# Patient Record
Sex: MALE
[De-identification: names, ages, dates, MRNs, and addresses within clinical notes are randomized per-mention and may not be internally consistent; named-entity substitution may affect disease eponyms.]

---

## 2017-12-08 ENCOUNTER — HOSPITAL ENCOUNTER (INPATIENT)
Dept: HOSPITAL 92 - ERS | Age: 82
LOS: 4 days | Discharge: NURSING, REHAB OR LONG-TERM CARE | DRG: 683 | End: 2017-12-12
Attending: INTERNAL MEDICINE | Admitting: INTERNAL MEDICINE
Payer: COMMERCIAL

## 2017-12-08 VITALS — BODY MASS INDEX: 27.9 KG/M2

## 2017-12-08 DIAGNOSIS — D64.9: ICD-10-CM

## 2017-12-08 DIAGNOSIS — E11.22: ICD-10-CM

## 2017-12-08 DIAGNOSIS — F31.9: ICD-10-CM

## 2017-12-08 DIAGNOSIS — T39.395A: ICD-10-CM

## 2017-12-08 DIAGNOSIS — T37.0X5A: ICD-10-CM

## 2017-12-08 DIAGNOSIS — T38.3X5A: ICD-10-CM

## 2017-12-08 DIAGNOSIS — F03.90: ICD-10-CM

## 2017-12-08 DIAGNOSIS — E87.5: ICD-10-CM

## 2017-12-08 DIAGNOSIS — N18.5: ICD-10-CM

## 2017-12-08 DIAGNOSIS — E87.2: ICD-10-CM

## 2017-12-08 DIAGNOSIS — I12.0: ICD-10-CM

## 2017-12-08 DIAGNOSIS — N17.9: Primary | ICD-10-CM

## 2017-12-08 DIAGNOSIS — F32.9: ICD-10-CM

## 2017-12-08 LAB
ALP SERPL-CCNC: 91 U/L (ref 40–150)
ALT SERPL W P-5'-P-CCNC: 50 U/L (ref 8–55)
ANION GAP SERPL CALC-SCNC: 17 MMOL/L (ref 10–20)
AST SERPL-CCNC: 32 U/L (ref 5–34)
BASOPHILS # BLD AUTO: 0 THOU/UL (ref 0–0.2)
BASOPHILS NFR BLD AUTO: 0.7 % (ref 0–1)
BILIRUB SERPL-MCNC: 0.6 MG/DL (ref 0.2–1.2)
BUN SERPL-MCNC: 97 MG/DL (ref 8.4–25.7)
CALCIUM SERPL-MCNC: 9.3 MG/DL (ref 7.8–10.44)
CHLORIDE SERPL-SCNC: 105 MMOL/L (ref 98–107)
CO2 SERPL-SCNC: 20 MMOL/L (ref 23–31)
CREAT CL PREDICTED SERPL C-G-VRATE: 0 ML/MIN (ref 70–130)
EOSINOPHIL # BLD AUTO: 0.3 THOU/UL (ref 0–0.7)
EOSINOPHIL NFR BLD AUTO: 4.1 % (ref 0–10)
GLOBULIN SER CALC-MCNC: 3.7 G/DL (ref 2.4–3.5)
HCT VFR BLD CALC: 29.5 % (ref 42–52)
LYMPHOCYTES # BLD: 1.1 THOU/UL (ref 1.2–3.4)
LYMPHOCYTES NFR BLD AUTO: 17.3 % (ref 21–51)
MONOCYTES # BLD AUTO: 0.4 THOU/UL (ref 0.11–0.59)
MONOCYTES NFR BLD AUTO: 5.6 % (ref 0–10)
NEUTROPHILS # BLD AUTO: 4.4 THOU/UL (ref 1.4–6.5)
RBC # BLD AUTO: 2.73 MILL/UL (ref 4.7–6.1)
WBC # BLD AUTO: 6.1 THOU/UL (ref 4.8–10.8)

## 2017-12-08 PROCEDURE — 36415 COLL VENOUS BLD VENIPUNCTURE: CPT

## 2017-12-08 PROCEDURE — 85025 COMPLETE CBC W/AUTO DIFF WBC: CPT

## 2017-12-08 PROCEDURE — 80048 BASIC METABOLIC PNL TOTAL CA: CPT

## 2017-12-08 PROCEDURE — 76770 US EXAM ABDO BACK WALL COMP: CPT

## 2017-12-08 PROCEDURE — 93005 ELECTROCARDIOGRAM TRACING: CPT

## 2017-12-08 PROCEDURE — 94640 AIRWAY INHALATION TREATMENT: CPT

## 2017-12-08 PROCEDURE — 36416 COLLJ CAPILLARY BLOOD SPEC: CPT

## 2017-12-08 PROCEDURE — 80053 COMPREHEN METABOLIC PANEL: CPT

## 2017-12-08 RX ADMIN — HEPARIN SODIUM SCH UNITS: 5000 INJECTION, SOLUTION INTRAVENOUS; SUBCUTANEOUS at 21:21

## 2017-12-08 NOTE — HP
PRIMARY  CARE PHYSICIAN:  Dr. Neymar Alanis.

 

CHIEF COMPLAINT:  Elevated creatinine.

 

HISTORY OF PRESENT ILLNESS:  Mr. Goldsmith is a pleasant 83-year-old gentleman who 
is a resident of [facility], who was sent to the emergency room 
from the nursing home because of abnormal labs.

 

He denies any chest pain or shortness of breath.  He reports that he has not 
been eating as well as he should.  He reports that he has some difficulty 
accessing his wash room.

 

The patient provides conflicting history at different times, history is 
unreliable.  Collateral history was obtained from review of medical records as 
well as discussion with the emergency room physician.

 

The patient appears to have been started on clindamycin and Bactrim for 
cellulitis on 12/04/2017.

 

REVIEW OF SYSTEMS:  The following complete review of systems was negative, 
unless otherwise mentioned in the HPI or below:

CONSTITUTIONAL:  Weight loss or gain, sense of well-being, ability to conduct 
usual activities, exercise tolerance.

SKIN/BREAST:  Rash, itching, changes in hair growth or loss, nail changes, 
breast lumps, tenderness, swelling, nipple discharge.

EYES:  Vision, double vision, tearing, blind spots, pain.

ENT/MOUTH:  Headaches (location, time of onset, duration, precipitating factors)
, vertigo, lightheadedness, injury. Vision, double vision, tearing, blind spots
, pain, nose bleeding, colds, obstruction, discharge, dental difficulties, 
gingival bleeding, dentures, neck stiffness, pain, tenderness, masses in 
thyroid or other areas.

CARDIOVASCULAR:  Precordial pain, substernal distress, palpitations, syncope, 
dyspnea on exertion, orthopnea, nocturnal paroxysmal dyspnea, edema, cyanosis, 
hypertension, heart murmurs, varicosities, phlebitis, claudication.

RESPIRATORY:  Pain, shortness of breath, wheezing, stridor, cough, hemoptysis, 
fever or night sweats

GASTROINTESTINAL:  Poor appetite, dysphagia, indigestion, abdominal pain, 
heartburn, eructation, nausea, vomiting, hematemesis, jaundice, constipation, 
or diarrhea, abnormal stools (baylee-colored, tarry, bloody, greasy, foul smelling
), flatulence, hemorrhoids, recent changes in bowel habits.

GENITOURINARY:  Urgency, frequency, dysuria, nocturia, hematuria, polyuria, 
oliguria, unusual (or change in) color of urine, stones, hesitancy, change in 
size of stream, dribbling, acute retention or incontinence, libido, potency.

MUSCULOSKELETAL:  Pain, swelling, redness or heat of muscles or joints, 
limitation, of motion, muscular weakness, atrophy, cramps.

NEUROLOGIC/PSYCHIATRIC:  Convulsions, paralyses, tremor, incoordination, 
paresthesias, difficulties with memory of speech, sensory or motor disturbances
, or muscular coordination (ataxia, tremor), emotional problems, anxiety, 
depression, previous psychiatric care, unusual perceptions, hallucinations.

ALLERGY/IMMUNOLOGIC:  Skin rash, anemia, bleeding tendency, polydipsia, polyuria
, intolerance to heat or cold.

 

PAST MEDICAL HISTORY:  Significant for dementia, diabetes mellitus type 2, and 
hypertension.

 

PAST SURGICAL HISTORY:  Unable to elicit.

 

PSYCHIATRIC HISTORY:  Significant for depression and bipolar disorder.

 

FAMILY HISTORY:  Unable to elicit.

 

CODE STATUS:  I could not discuss CODE STATUS with him.  This will need to be 
addressed.

 

ALLERGIES:  No known drug allergies.

 

CURRENT MEDICATIONS:  Include clindamycin 300 mg every 6 hours, Prozac 20 mg 
daily, metformin 500 mg 2 times a day, multivitamins 1 tablet daily, 
risperidone 0.25 mg 2 times a day, Motrin 400 mg 4 times a day for 7 days, 
Bactrim 1 tablet 2 times a day.

 

PHYSICAL EXAMINATION:

GENERAL:  Mr. Goldsmith is awake and alert, not in acute distress.

VITAL SIGNS:  Blood pressure is 139/64, pulse is 71.  He is breathing at rate 
of 20 and saturating 96% on room air.  He is afebrile.

EYES:  No scleral icterus.  No conjunctival pallor.

ENT:  Dry mucosal membranes, no oropharyngeal erythema or exudates.

NECK:  Supple, nontender, normal range of movement, trachea is midline.

RESPIRATORY:  Accessory muscles of breathing are not active.  Chest wall 
movements are symmetric bilaterally.

LUNGS:  Clear to auscultation without wheeze, rhonchi or crepitations.

ABDOMEN:  Soft, nontender, bowel sounds are heard, no hepatomegaly, no 
splenomegaly.

NEUROLOGIC:  Cranial nerves II-XII are intact.  Deep tendon reflexes are 2+.

SKIN:  No rashes or subcutaneous nodules.  Multiple tattoos present.

MUSCULOSKELETAL:  He has a splint over his left wrist.  He reports that is for 
gout.  Power is 5/5 in all 4 extremities.

LYMPHATIC:  No cervical lymphadenopathy.

PSYCHIATRIC:  Normal mood, normal affect, patient is oriented to person and 
place, not to time.

 

Mr. Goldsmith's labs and investigations were reviewed.  Laboratory investigations 
were ordered by the emergency room, physician are pending.  Review of the blood 
work from [facility] shows that on 12/07/2017, he had sodium 139, 
potassium 5.1, anion gap elevated at 22, decreased calcium of 8.5, elevated 
blood urea nitrogen of 75, elevated creatinine of 5.0, decreased albumin of 2.8
, elevated ALT of 57, normal total bilirubin, normal AST, normal alkaline 
phosphatase.  Hemoglobin A1c was 5.6.

 

ASSESSMENT AND PLAN:  Mr. Goldsmith is a pleasant 83-year-old gentleman, who was 
seen at [facility] on 12/08/2017.  His problem list 
includes:

1.  Acute renal failure:  Mr. Goldsmith appears to be presenting with acute renal 
failure, likely multifactorial, given his use of nonsteroidal anti-inflammatory 
agents and poor oral intake.  Bactrim may have contributed to the elevated 
creatinine as well.  This may have been worsened by his concomitant use of 
metformin.  He will be admitted to the hospital.  Nephrotoxic medications will 
be on hold.  He will receive intravenous hydration.  Nephrology Service is 
being consulted for their opinion and help with further management.

2.  Dementia:  Appears to be stable at this time.

3.  Diabetes mellitus.  Hold metformin, start Accu-Cheks and insulin sliding 
scale.

4.  Hypertension:  Monitor vital signs, titrate antihypertensives as needed.

5.  History of cellulitis:  This appears to have resolved.  We will stop 
antibiotics.

 

Many thanks for allowing me to participate in your patient's care.  Please feel 
free to contact me with any questions or concerns.

 

LEVEL OF RISK:  Moderate.

 

LEVEL OF COMPLEXITY:  Moderate.

 

MTDD

## 2017-12-08 NOTE — CON
DATE OF CONSULTATION:  12/08/2017

 

REASON FOR CONSULTATION:  Elevated creatinine.

 

HISTORY OF PRESENT ILLNESS:  This is a very pleasant 83-year-old gentleman with a baseline creatinine
 of 1.1 in 10/10, presented to the hospital with altered mental status and a creatinine of 5.  The pa
fly denies no headache, numbness, tingling or weakness.  Denies any nausea, vomiting or chest pain.
  The patient was recently treated with clindamycin and Bactrim.  The patient has had history of poor
ly controlled diabetes mellitus.

 

PAST MEDICAL HISTORY:  Significant for chest pain, laminectomy, spinal stenosis, diabetes mellitus po
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

 

HOME MEDICATIONS:  List reviewed.

 

HOSPITAL MEDICATIONS:  List reviewed.

 

ALLERGIES:  Reviewed.

 

FAMILY HISTORY:  Negative for ESRD.

 

SOCIAL HISTORY:  No drug use.

 

REVIEW OF SYSTEMS:  Fifteen point review of systems was performed and negative except for positives n
oted above.

General:  Weakness.  Head:  Headache.  Neck:  No swelling or lumps.  Nose:  No epistaxis or discharge
.  Eyes:  No diplopia or pain.  Respiratory:  Dyspnea.  Cardiovascular:  Chest pain.  Gastrointestina
l:  Nausea.  /GYN:  Hematuria.  Musculoskeletal:  No joint pain.  Neuropsychiatic systems:  No suic
idal ideation.  No ideation.  Skin:  Denies any rash or ulcer.  Constitutional:   No fever or chills.


 

PHYSICAL EXAMINATION:

GENERAL:  Patient is awake, alert.

VITAL SIGNS:  Afebrile, pulse 75, breathing at 16, blood pressure was 130/70.

GENERAL APPEARANCE AND MENTAL STATUS:  Fair.

HEAD/NECK:  Normocephalic.   Atraumatic.

EYES:  EOMI.  No deformity.

EARS:  Clear.  No ulcers.

NOSE:   Intact.  No lesions.

MOUTH:  Clear.  No discharge.

THROAT:  Clear.  No exudate.

LUNGS:   Clear.  No crackles.

CARDIAC:   S1, S2.  No rub.

ABDOMEN:   Benign.  BS+.

GENITALIA/RECTUM:  Horowitz absent.

BACK/EXTREMITIES:  Edema 0+ Ulcer-

NEUROLOGICAL:  Alert and motor intact.                     

SKIN:  Rash- Bruise-  

LYMPHATICS:  Edema- Ulcer-

 

LABORATORY DATA:  Creatinine 5, potassium is normal.

 

ASSESSMENT AND RECOMMENDATIONS:

1.  Stage V chronic kidney disease with acute kidney failure.  Would recommend hydration.  Would hold
 Bactrim.  Can use Cipro.

2.  Anemia, stable.

3.  Medications based on glomerular filtration rate are appropriate.  No indication for dialysis at t
his time.  We will follow the patient's renal function closely and order renal imaging.

## 2017-12-09 LAB
ANION GAP SERPL CALC-SCNC: 17 MMOL/L (ref 10–20)
ANION GAP SERPL CALC-SCNC: 17 MMOL/L (ref 10–20)
BASOPHILS # BLD AUTO: 0 THOU/UL (ref 0–0.2)
BASOPHILS NFR BLD AUTO: 0.5 % (ref 0–1)
BUN SERPL-MCNC: 78 MG/DL (ref 8.4–25.7)
BUN SERPL-MCNC: 84 MG/DL (ref 8.4–25.7)
CALCIUM SERPL-MCNC: 8.6 MG/DL (ref 7.8–10.44)
CALCIUM SERPL-MCNC: 8.9 MG/DL (ref 7.8–10.44)
CHLORIDE SERPL-SCNC: 109 MMOL/L (ref 98–107)
CHLORIDE SERPL-SCNC: 111 MMOL/L (ref 98–107)
CO2 SERPL-SCNC: 16 MMOL/L (ref 23–31)
CO2 SERPL-SCNC: 17 MMOL/L (ref 23–31)
CREAT CL PREDICTED SERPL C-G-VRATE: 13 ML/MIN (ref 70–130)
CREAT CL PREDICTED SERPL C-G-VRATE: 14 ML/MIN (ref 70–130)
EOSINOPHIL # BLD AUTO: 0.2 THOU/UL (ref 0–0.7)
EOSINOPHIL NFR BLD AUTO: 4.2 % (ref 0–10)
HCT VFR BLD CALC: 28.8 % (ref 42–52)
LYMPHOCYTES # BLD: 1 THOU/UL (ref 1.2–3.4)
LYMPHOCYTES NFR BLD AUTO: 17 % (ref 21–51)
MONOCYTES # BLD AUTO: 0.5 THOU/UL (ref 0.11–0.59)
MONOCYTES NFR BLD AUTO: 7.8 % (ref 0–10)
NEUTROPHILS # BLD AUTO: 4.1 THOU/UL (ref 1.4–6.5)
RBC # BLD AUTO: 2.63 MILL/UL (ref 4.7–6.1)
WBC # BLD AUTO: 5.8 THOU/UL (ref 4.8–10.8)

## 2017-12-09 RX ADMIN — HEPARIN SODIUM SCH UNITS: 5000 INJECTION, SOLUTION INTRAVENOUS; SUBCUTANEOUS at 14:49

## 2017-12-09 RX ADMIN — ALBUTEROL SULFATE SCH MG: 2.5 SOLUTION RESPIRATORY (INHALATION) at 10:43

## 2017-12-09 RX ADMIN — ALBUTEROL SULFATE SCH: 2.5 SOLUTION RESPIRATORY (INHALATION) at 11:49

## 2017-12-09 RX ADMIN — INSULIN HUMAN PRN UNIT: 100 INJECTION, SOLUTION PARENTERAL at 06:39

## 2017-12-09 RX ADMIN — ALBUTEROL SULFATE SCH: 1.25 SOLUTION RESPIRATORY (INHALATION) at 08:45

## 2017-12-09 RX ADMIN — HEPARIN SODIUM SCH UNITS: 5000 INJECTION, SOLUTION INTRAVENOUS; SUBCUTANEOUS at 20:38

## 2017-12-09 RX ADMIN — ALBUTEROL SULFATE SCH MG: 2.5 SOLUTION RESPIRATORY (INHALATION) at 09:25

## 2017-12-09 RX ADMIN — ALBUTEROL SULFATE SCH: 2.5 SOLUTION RESPIRATORY (INHALATION) at 11:48

## 2017-12-09 RX ADMIN — ALBUTEROL SULFATE SCH: 1.25 SOLUTION RESPIRATORY (INHALATION) at 09:07

## 2017-12-09 RX ADMIN — HEPARIN SODIUM SCH UNITS: 5000 INJECTION, SOLUTION INTRAVENOUS; SUBCUTANEOUS at 07:55

## 2017-12-09 NOTE — ULT
BILATERAL RENAL ULTRASOUND:

 

Date:  12/09/17 

 

HISTORY:  

Acute kidney insufficiency. 

 

COMPARISON:  

None. 

 

TECHNIQUE:  

Sagittal and transverse imaging of kidneys performed. 

 

FINDINGS:

Bilaterally, no hydronephrosis. 

 

There is an anechoic focus in the right renal pelvis likely representing a parapelvic cyst measuring 
7.2 x 6.0 x 7.3 cm. 

 

Right kidney measures 11.0 x 6.1 x 5.7 cm. 

 

Left kidney measures 11.0 x 5.2 x 6.2 cm. 

 

Urinary bladder has a pre-void volume of 272 cm2. No mucosal abnormality. 

 

IMPRESSION: 

1.  No hydronephrosis. 

2.  Right renal cyst. 

 

 

POS: [facility]

## 2017-12-09 NOTE — PDOC.PN
- Subjective


Encounter Start Date: 12/09/17


Encounter Start Time: 08:20





Pt seen for followup re:  MARTÍN.  Denies chest pain, shortness of breath, fevers 

or chills.





- Objective


MAR Reviewed: Yes


Vital Signs & Weight: 


 Vital Signs (12 hours)











  Temp Pulse Resp BP BP Pulse Ox


 


 12/09/17 10:43   111 H  18    97


 


 12/09/17 09:25   106 H  18    96


 


 12/09/17 08:00  98.4 F  106 H  18    98


 


 12/09/17 07:56  98.4 F  106 H  18  122/66   96


 


 12/09/17 04:00  97.4 F L  80  20   127/64  95


 


 12/09/17 00:00  97.7 F  84  20   135/56 L  96








 Weight











Admit Weight                   205 lb 14.576 oz


 


Weight                         205 lb 14.576 oz














I&O: 


 











 12/08/17 12/09/17 12/10/17





 06:59 06:59 06:59


 


Intake Total   1590


 


Output Total   1050


 


Balance   540











Result Diagrams: 


 12/09/17 04:03





 12/09/17 04:03


Additional Labs: 


 Accuchecks











  12/09/17





  05:44


 


POC Glucose  193 H














Dx/Plan


(1) MARTÍN (acute kidney injury)


Code(s): N17.9 - ACUTE KIDNEY FAILURE, UNSPECIFIED   Status: Acute   





(2) Hyperkalemia


Code(s): E87.5 - HYPERKALEMIA   Status: Acute   





(3) Metabolic acidosis


Code(s): E87.2 - ACIDOSIS   Status: Acute   





(4) DM2 (diabetes mellitus, type 2)


Status: Chronic   





(5) HTN (hypertension)


Code(s): I10 - ESSENTIAL (PRIMARY) HYPERTENSION   Status: Chronic   





(6) Dementia


Code(s): F03.90 - UNSPECIFIED DEMENTIA WITHOUT BEHAVIORAL DISTURBANCE   Status: 

Chronic   





- Plan


PT/OT, out of bed/ambulate, DVT proph w/heparin





* .


Continue IV fluids.


Bicarbonate added by nephrology service.


Administer kayexalate, beta agonist nebs andrecheck potassium.


Continue accuchecks, insulin.


Monitor vital signs, titrate antihypertensives as needed.





Review of Systems





- Review of Systems


Constitutional: Other.  negative: Fever, Chills, Sweats, Weakness, Malaise


Respiratory: negative: Cough, Dry, Shortness of Breath, Hemoptysis, SOB with 

Excertion, Pleuritic Pain, Sputum, Wheezing


Cardiovascular: negative: Chest Pain, Palpitations, Orthopnea, Paroxysmal Noc. 

Dyspnea, Edema, Light Headedness


Gastrointestinal: negative: Nausea, Vomiting, Abdominal Pain, Diarrhea, 

Constipation, Melena, Hematochezia


Genitourinary: negative: Dysuria, Frequency, Incontinence, Hematuria, Retention





- Medications/Allergies


Allergies/Adverse Reactions: 


 Allergies











Allergy/AdvReac Type Severity Reaction Status Date / Time


 


No Known Drug Allergies Allergy   Verified 12/08/17 18:13











Medications: 


 Current Medications





Acetaminophen (Tylenol)  650 mg PO Q4H PRN


   PRN Reason: Headache/Fever or Pain


Albuterol Sulfate (Ventolin)  7.5 mg NEB Q1H [facility]


   Stop: 12/09/17 11:31


   Last Admin: 12/09/17 10:43 Dose:  7.5 mg


Albuterol Sulfate (Ventolin)  2.5 mg NEB Q1H [facility]


   Stop: 12/09/17 11:31


   Last Admin: 12/09/17 10:43 Dose:  2.5 mg


Bisacodyl (Dulcolax)  10 mg PO DAILYPRN PRN


   PRN Reason: Constipation


Dextrose/Water (Dextrose 50%)  25 gm SLOW IVP PRN PRN


   PRN Reason: Hypoglycemia


Glucagon (Glucagon)  1 mg IM PRN PRN


   PRN Reason: Hypoglycemia


Heparin Sodium (Porcine) (Heparin)  5,000 units SC TID [facility]


   Last Admin: 12/09/17 07:55 Dose:  5,000 units


Dextrose/Water (D5w)  1,000 mls @ 0 mls/hr IV .Q0M PRN; As Directed


   PRN Reason: Hypoglycemia


Sodium Bicarbonate 150 meq/ (Dextrose/Water)  1,150 mls @ 100 mls/hr IV 

[id number] [facility]


Insulin Human Regular (Humulin R)  0 units SC .MILD SLIDING SCALE PRN


   PRN Reason: Mild Correctional Scale


   Last Admin: 12/09/17 06:39 Dose:  2 unit

## 2017-12-09 NOTE — PRG
DATE OF SERVICE:  12/09/2017

 

SUBJECTIVE:  An 83-year-old gentleman being seen for acute kidney injury.  The patient denies any linda
sea, vomiting or chest pain.

 

PHYSICAL EXAMINATION:

GENERAL:  Patient is awake and alert.

VITAL SIGNS:  Afebrile, pulse 111, breathing at 16, blood pressure 122/66.

HEAD/NECK:  Normocephalic.   Atraumatic.

EYES:  EOMI.  No deformity.

EARS:  Clear.  No ulcers.

NOSE:   Intact.  No lesions.

MOUTH:  Clear.  No discharge.

THROAT:  Clear.  No exudate.

LUNGS:   Clear.  No crackles.

CARDIAC:   S1, S2.  No rub.

ABDOMEN:   Benign.  BS+.

GENITALIA/RECTUM:  Horowitz absent.

BACK/EXTREMITIES:  Edema 0+ Ulcer-

NEUROLOGICAL:  Alert and motor intact.                     

SKIN:  Rash- Bruise-  

LYMPHATICS:  Edema- Ulcer-

 

LABORATORY DATA:  Show hemoglobin 9.4, potassium 5.8, creatinine 5.74.

 

ASSESSMENT AND RECOMMENDATIONS:

1.  Acute kidney injury, improving.  Continue hydration.

2.  Metabolic acidosis.  We will add sodium bicarbonate.

3.  Anemia, stable.

4.  Medications based on glomerular filtration rate are appropriate.  No urgent indication for dialys
is.  I will order renal imaging.

## 2017-12-10 LAB
ANION GAP SERPL CALC-SCNC: 12 MMOL/L (ref 10–20)
BASOPHILS # BLD AUTO: 0 THOU/UL (ref 0–0.2)
BASOPHILS NFR BLD AUTO: 0.2 % (ref 0–1)
BUN SERPL-MCNC: 57 MG/DL (ref 8.4–25.7)
CALCIUM SERPL-MCNC: 8.6 MG/DL (ref 7.8–10.44)
CHLORIDE SERPL-SCNC: 106 MMOL/L (ref 98–107)
CO2 SERPL-SCNC: 25 MMOL/L (ref 23–31)
CREAT CL PREDICTED SERPL C-G-VRATE: 20 ML/MIN (ref 70–130)
EOSINOPHIL # BLD AUTO: 0.2 THOU/UL (ref 0–0.7)
EOSINOPHIL NFR BLD AUTO: 3.7 % (ref 0–10)
HCT VFR BLD CALC: 25.8 % (ref 42–52)
LYMPHOCYTES # BLD: 1.2 THOU/UL (ref 1.2–3.4)
LYMPHOCYTES NFR BLD AUTO: 22.5 % (ref 21–51)
MONOCYTES # BLD AUTO: 0.5 THOU/UL (ref 0.11–0.59)
MONOCYTES NFR BLD AUTO: 8.3 % (ref 0–10)
NEUTROPHILS # BLD AUTO: 3.5 THOU/UL (ref 1.4–6.5)
RBC # BLD AUTO: 2.37 MILL/UL (ref 4.7–6.1)
WBC # BLD AUTO: 5.4 THOU/UL (ref 4.8–10.8)

## 2017-12-10 RX ADMIN — HYDROCODONE BITARTRATE AND ACETAMINOPHEN PRN TAB: 5; 325 TABLET ORAL at 01:38

## 2017-12-10 RX ADMIN — HYDROCODONE BITARTRATE AND ACETAMINOPHEN PRN TAB: 5; 325 TABLET ORAL at 05:28

## 2017-12-10 RX ADMIN — THERA TABS SCH TAB: TAB at 07:35

## 2017-12-10 RX ADMIN — HEPARIN SODIUM SCH UNITS: 5000 INJECTION, SOLUTION INTRAVENOUS; SUBCUTANEOUS at 21:05

## 2017-12-10 RX ADMIN — HEPARIN SODIUM SCH UNITS: 5000 INJECTION, SOLUTION INTRAVENOUS; SUBCUTANEOUS at 07:35

## 2017-12-10 RX ADMIN — HEPARIN SODIUM SCH UNITS: 5000 INJECTION, SOLUTION INTRAVENOUS; SUBCUTANEOUS at 14:56

## 2017-12-10 NOTE — PDOC.PN
- Subjective


Encounter Start Date: 12/10/17


Encounter Start Time: 09:00





Pt seen for followup re:  MARTÍN.  Denies chest pain, shortness of breath, fevers 

or chills.





- Objective


MAR Reviewed: Yes


Vital Signs & Weight: 


 Vital Signs (12 hours)











  Temp Pulse Resp BP Pulse Ox


 


 12/10/17 08:00  98.1 F  77  20  148/76 H  96








 Weight











Admit Weight                   205 lb 14.576 oz


 


Weight                         205 lb 14.576 oz














I&O: 


 











 12/09/17 12/10/17 12/11/17





 06:59 06:59 06:59


 


Intake Total  3990 240


 


Output Total  1050 


 


Balance  2940 240











Result Diagrams: 


 12/10/17 04:04





 12/10/17 04:04


Additional Labs: 


 Accuchecks











  12/09/17 12/09/17





  15:39 11:29


 


POC Glucose  268 H  166 H














Phys Exam





- Physical Examination


Constitutional: NAD


HEENT: moist MMs


Neck: supple


Respiratory: clear to auscultation bilateral


Cardiovascular: RRR


Gastrointestinal: soft


Musculoskeletal: pulses present


Neurological: moves all 4 limbs


Psychiatric: normal affect





Dx/Plan


(1) MARTÍN (acute kidney injury)


Code(s): N17.9 - ACUTE KIDNEY FAILURE, UNSPECIFIED   Status: Acute   





(2) Metabolic acidosis


Code(s): E87.2 - ACIDOSIS   Status: Acute   





(3) DM2 (diabetes mellitus, type 2)


Status: Chronic   





(4) HTN (hypertension)


Code(s): I10 - ESSENTIAL (PRIMARY) HYPERTENSION   Status: Chronic   





(5) Dementia


Code(s): F03.90 - UNSPECIFIED DEMENTIA WITHOUT BEHAVIORAL DISTURBANCE   Status: 

Chronic   





(6) Hyperkalemia


Code(s): E87.5 - HYPERKALEMIA   Status: Resolved   





- Plan


PT/OT, out of bed/ambulate, DVT proph w/heparin





* .


creatinine better.


Continue IV fluids.


Continue accuchecks, insulin.


Monitor vital signs, titrate antihypertensives as needed.





Review of Systems





- Review of Systems


Respiratory: negative: Cough, Dry, Shortness of Breath, Hemoptysis, SOB with 

Excertion, Pleuritic Pain, Sputum, Wheezing


Cardiovascular: negative: Chest Pain, Palpitations, Orthopnea, Paroxysmal Noc. 

Dyspnea, Edema, Light Headedness





- Medications/Allergies


Allergies/Adverse Reactions: 


 Allergies











Allergy/AdvReac Type Severity Reaction Status Date / Time


 


No Known Drug Allergies Allergy   Verified 12/08/17 18:13











Medications: 


 Current Medications





Acetaminophen (Tylenol)  650 mg PO Q4H PRN


   PRN Reason: Fever/Mild Pain


   Last Admin: 12/09/17 23:12 Dose:  650 mg


Bisacodyl (Dulcolax)  10 mg PO DAILYPRN PRN


   PRN Reason: Constipation


Dextrose/Water (Dextrose 50%)  25 gm SLOW IVP PRN PRN


   PRN Reason: Hypoglycemia


Docusate Sodium (Colace)  100 mg PO BID PRN


   PRN Reason: Constipation


Fluoxetine HCl (Prozac)  20 mg PO DAILY [facility]


   Last Admin: 12/10/17 07:35 Dose:  20 mg


Glucagon (Glucagon)  1 mg IM PRN PRN


   PRN Reason: Hypoglycemia


Heparin Sodium (Porcine) (Heparin)  5,000 units SC TID [facility]


   Last Admin: 12/10/17 07:35 Dose:  5,000 units


Dextrose/Water (D5w)  1,000 mls @ 0 mls/hr IV .Q0M PRN; As Directed


   PRN Reason: Hypoglycemia


Sodium Bicarbonate 150 meq/ (Dextrose/Water)  1,150 mls @ 100 mls/hr IV 

[id number] [facility]


   Last Admin: 12/10/17 10:18 Dose:  Not Given


Insulin Human Regular (Humulin R)  0 units SC .MILD SLIDING SCALE PRN


   PRN Reason: Mild Correctional Scale


   Last Admin: 12/09/17 06:39 Dose:  2 unit


Multivitamins (Theragran)  1 tab PO DAILY [facility]


   Last Admin: 12/10/17 07:35 Dose:  1 tab


Risperidone (Risperidone)  0.25 mg PO BID [facility]


   Last Admin: 12/10/17 07:35 Dose:  0.25 mg

## 2017-12-10 NOTE — PRG
DATE OF SERVICE:  12/10/2017

 

SUBJECTIVE:  This is an 83-year-old gentleman being seen for acute kidney injury.  The patient denies
 any nausea, vomiting, or chest pain.

 

PHYSICAL EXAMINATION:

GENERAL:  Patient is awake, alert.

VITAL SIGNS:  Afebrile, pulse 70, breathing at 16, blood pressure 142/76.

GENERAL APPEARANCE AND MENTAL STATUS:  Fair.

HEAD/NECK:  Normocephalic.  Atraumatic.

EYES:  EOMI.  No deformity.

EARS:  Clear.  No ulcers.

NOSE:  Intact.  No lesions.

MOUTH:  Clear.  No discharge.

THROAT:  Clear.  No exudate.

LUNGS:  Clear.  No crackles.

CARDIAC:  S1, S2.  No rub.

ABDOMEN:  Benign.  BS+.

GENITALIA/RECTUM:  Horowitz absent.

BACK/EXTREMITIES:  Edema 0+ Ulcer-

NEUROLOGICAL:  Alert and motor intact.

SKIN:  Rash- Bruise-

LYMPHATICS:  Edema- Ulcer-

 

LABORATORY:  Hemoglobin 8.3, creatinine 3.6.

 

ASSESSMENT AND RECOMMENDATIONS:

1.  Acute kidney injury, improving.

2.  Hypertension, stable.

3.  Anemia, stable.

4.  Medications based on GFR are appropriate.

5.  Metabolic acidosis, improved.  No indication for dialysis at this time.

## 2017-12-11 LAB
ANION GAP SERPL CALC-SCNC: 11 MMOL/L (ref 10–20)
BASOPHILS # BLD AUTO: 0 THOU/UL (ref 0–0.2)
BASOPHILS NFR BLD AUTO: 0.6 % (ref 0–1)
BUN SERPL-MCNC: 33 MG/DL (ref 8.4–25.7)
CALCIUM SERPL-MCNC: 9.1 MG/DL (ref 7.8–10.44)
CHLORIDE SERPL-SCNC: 104 MMOL/L (ref 98–107)
CO2 SERPL-SCNC: 29 MMOL/L (ref 23–31)
CREAT CL PREDICTED SERPL C-G-VRATE: 29 ML/MIN (ref 70–130)
EOSINOPHIL # BLD AUTO: 0.2 THOU/UL (ref 0–0.7)
EOSINOPHIL NFR BLD AUTO: 3.9 % (ref 0–10)
HCT VFR BLD CALC: 27.8 % (ref 42–52)
LYMPHOCYTES # BLD: 1.6 THOU/UL (ref 1.2–3.4)
LYMPHOCYTES NFR BLD AUTO: 27.5 % (ref 21–51)
MONOCYTES # BLD AUTO: 0.3 THOU/UL (ref 0.11–0.59)
MONOCYTES NFR BLD AUTO: 5.6 % (ref 0–10)
NEUTROPHILS # BLD AUTO: 3.5 THOU/UL (ref 1.4–6.5)
RBC # BLD AUTO: 2.57 MILL/UL (ref 4.7–6.1)
WBC # BLD AUTO: 5.7 THOU/UL (ref 4.8–10.8)

## 2017-12-11 RX ADMIN — SODIUM BICARBONATE SCH MLS: 84 INJECTION, SOLUTION INTRAVENOUS at 05:50

## 2017-12-11 RX ADMIN — INSULIN HUMAN PRN UNIT: 100 INJECTION, SOLUTION PARENTERAL at 11:58

## 2017-12-11 RX ADMIN — HEPARIN SODIUM SCH UNITS: 5000 INJECTION, SOLUTION INTRAVENOUS; SUBCUTANEOUS at 20:33

## 2017-12-11 RX ADMIN — INSULIN HUMAN PRN UNIT: 100 INJECTION, SOLUTION PARENTERAL at 20:35

## 2017-12-11 RX ADMIN — SODIUM BICARBONATE SCH: 84 INJECTION, SOLUTION INTRAVENOUS at 15:13

## 2017-12-11 RX ADMIN — SODIUM BICARBONATE SCH MLS: 84 INJECTION, SOLUTION INTRAVENOUS at 11:58

## 2017-12-11 RX ADMIN — HEPARIN SODIUM SCH UNITS: 5000 INJECTION, SOLUTION INTRAVENOUS; SUBCUTANEOUS at 14:24

## 2017-12-11 RX ADMIN — THERA TABS SCH TAB: TAB at 08:47

## 2017-12-11 RX ADMIN — HEPARIN SODIUM SCH UNITS: 5000 INJECTION, SOLUTION INTRAVENOUS; SUBCUTANEOUS at 08:47

## 2017-12-11 NOTE — PDOC.PN
- Subjective


Encounter Start Date: 12/11/17


Encounter Start Time: 09:20





Pt seen for followup re:  MARTÍN,  Feels well, no complaints.





- Objective


Vital Signs & Weight: 


 Vital Signs (12 hours)











  Temp Pulse Resp BP Pulse Ox


 


 12/11/17 08:00  98.0 F  82  20  138/70  95








 Weight











Admit Weight                   205 lb 14.576 oz


 


Weight                         205 lb 14.576 oz














I&O: 


 











 12/10/17 12/11/17 12/12/17





 06:59 06:59 06:59


 


Intake Total 3990 2480 


 


Output Total 1050  


 


Balance 2940 2480 











Result Diagrams: 


 12/11/17 04:07





 12/11/17 04:07


Additional Labs: 


 Accuchecks











  12/11/17 12/11/17 12/10/17





  11:41 05:10 20:38


 


POC Glucose  190 H  160 H  170 H














  12/10/17 12/10/17 12/09/17





  16:45 05:34 19:53


 


POC Glucose  169 H  144 H  165 H














Phys Exam





- Physical Examination


Constitutional: NAD


HEENT: moist MMs


Neck: supple


Respiratory: clear to auscultation bilateral


Cardiovascular: RRR


Gastrointestinal: soft


Musculoskeletal: no edema


Neurological: moves all 4 limbs


Psychiatric: normal affect


Skin: no rash





Dx/Plan


(1) MARTÍN (acute kidney injury)


Code(s): N17.9 - ACUTE KIDNEY FAILURE, UNSPECIFIED   Status: Acute   





(2) DM2 (diabetes mellitus, type 2)


Status: Chronic   





(3) HTN (hypertension)


Code(s): I10 - ESSENTIAL (PRIMARY) HYPERTENSION   Status: Chronic   





(4) Dementia


Code(s): F03.90 - UNSPECIFIED DEMENTIA WITHOUT BEHAVIORAL DISTURBANCE   Status: 

Chronic   





(5) Hyperkalemia


Code(s): E87.5 - HYPERKALEMIA   Status: Resolved   





(6) Metabolic acidosis


Code(s): E87.2 - ACIDOSIS   Status: Resolved   





- Plan


PT/OT, out of bed/ambulate





* .


Creatinine improving.


Obtain previous records from PCP.


Continue accuchecks, insulin.





Review of Systems





- Review of Systems


Respiratory: negative: Cough, Dry, Shortness of Breath, Hemoptysis, SOB with 

Excertion, Pleuritic Pain, Sputum, Wheezing


Cardiovascular: negative: Chest Pain, Palpitations, Orthopnea, Paroxysmal Noc. 

Dyspnea, Edema, Light Headedness





- Medications/Allergies


Allergies/Adverse Reactions: 


 Allergies











Allergy/AdvReac Type Severity Reaction Status Date / Time


 


No Known Drug Allergies Allergy   Verified 12/08/17 18:13











Medications: 


 Current Medications





Acetaminophen (Tylenol)  650 mg PO Q4H PRN


   PRN Reason: Fever/Mild Pain


   Last Admin: 12/11/17 00:38 Dose:  650 mg


Bisacodyl (Dulcolax)  10 mg PO DAILYPRN PRN


   PRN Reason: Constipation


Dextrose/Water (Dextrose 50%)  25 gm SLOW IVP PRN PRN


   PRN Reason: Hypoglycemia


Docusate Sodium (Colace)  100 mg PO BID PRN


   PRN Reason: Constipation


Fluoxetine HCl (Prozac)  20 mg PO DAILY [facility]


   Last Admin: 12/11/17 08:47 Dose:  20 mg


Glucagon (Glucagon)  1 mg IM PRN PRN


   PRN Reason: Hypoglycemia


Heparin Sodium (Porcine) (Heparin)  5,000 units SC TID [facility]


   Last Admin: 12/11/17 08:47 Dose:  5,000 units


Dextrose/Water (D5w)  1,000 mls @ 0 mls/hr IV .Q0M PRN; As Directed


   PRN Reason: Hypoglycemia


Sodium Bicarbonate 75 meq/ (Dextrose/Water)  575 mls @ 100 mls/hr IV .Q5H45M [facility]


   Last Admin: 12/11/17 11:58 Dose:  575 mls


Insulin Human Regular (Humulin R)  0 units SC .MILD SLIDING SCALE PRN


   PRN Reason: Mild Correctional Scale


   Last Admin: 12/11/17 11:58 Dose:  2 unit


Multivitamins (Theragran)  1 tab PO DAILY [facility]


   Last Admin: 12/11/17 08:47 Dose:  1 tab


Risperidone (Risperidone)  0.25 mg PO BID [facility]


   Last Admin: 12/11/17 08:47 Dose:  0.25 mg

## 2017-12-11 NOTE — PRG
DATE OF SERVICE:  12/11/2017

 

SUBJECTIVE:  Patient was seen and examined at bedside and overnight events noted.  Patient denies any
 shortness of breath or chest pain or palpitation.  No history of nausea or vomiting or diarrhea or f
ever or chills or cramps.

 

OBJECTIVE:

GENERAL:  This is an elderly male in no apparent distress.

VITAL SIGNS:  Temperature 98.0, pulse 80, respiratory rate 18, blood pressure 138/70.

HEENT:  Atraumatic, normocephalic.  Oral mucosa is moist.

NECK:  Supple.

CARDIOVASCULAR:  S1, S2 heard.  Rate and rhythm regular.

RESPIRATORY:  Clear to auscultation.

GASTROINTESTINAL:  Abdomen is soft.

MUSCULOSKELETAL:  No tenderness.  No edema.

DERMATOLOGIC:  No skin rash.

NEUROLOGIC:  Alert and awake and oriented x3.  No focal neurologic deficits. Moving all the extremiti
es.

PSYCHIATRIC:  Mood and affect normal.

 

LABORATORY DATA:  Potassium is 4.6, BUN 33, creatinine is 2.5.

 

ASSESSMENT AND PLAN:

1.  Acute kidney injury, improving.  We will change IV fluids to half normal saline.

2.  Acidosis, much better.  Will change bicarbonate to half normal saline.

3.  Hypertension, stable.

4.  Anemia.

5.  Plan is to change IV fluids to half normal saline, monitor renal function which is getting better
.  Avoid nephrotoxins, renally dose all the medicines.  We will follow.

## 2017-12-12 VITALS — TEMPERATURE: 98.4 F | SYSTOLIC BLOOD PRESSURE: 175 MMHG | DIASTOLIC BLOOD PRESSURE: 85 MMHG

## 2017-12-12 LAB
ANION GAP SERPL CALC-SCNC: 10 MMOL/L (ref 10–20)
BUN SERPL-MCNC: 23 MG/DL (ref 8.4–25.7)
CALCIUM SERPL-MCNC: 8.6 MG/DL (ref 7.8–10.44)
CHLORIDE SERPL-SCNC: 103 MMOL/L (ref 98–107)
CO2 SERPL-SCNC: 26 MMOL/L (ref 23–31)
CREAT CL PREDICTED SERPL C-G-VRATE: 35 ML/MIN (ref 70–130)

## 2017-12-12 RX ADMIN — INSULIN HUMAN PRN UNIT: 100 INJECTION, SOLUTION PARENTERAL at 16:57

## 2017-12-12 RX ADMIN — THERA TABS SCH TAB: TAB at 08:11

## 2017-12-12 RX ADMIN — HEPARIN SODIUM SCH UNITS: 5000 INJECTION, SOLUTION INTRAVENOUS; SUBCUTANEOUS at 08:11

## 2017-12-12 RX ADMIN — HEPARIN SODIUM SCH: 5000 INJECTION, SOLUTION INTRAVENOUS; SUBCUTANEOUS at 13:45

## 2017-12-12 NOTE — DIS
DATE OF ADMISSION:  12/08/2017

 

DATE OF DISCHARGE:  12/12/2017

 

PRIMARY CARE PHYSICIAN:  Neymar Alanis M.D.

 

DISCHARGE DIAGNOSIS:  Acute on chronic renal failure.

 

CONDITION OF PATIENT ON THE DAY OF DISCHARGE:  Stable.  I assessed Mr. Goldsmith on the day of discharge
.  He denies any chest pain or shortness of breath.  Vital signs are stable.  S1 and S2 are heard, re
gular.  Lungs are clear to auscultation bilaterally.

 

DISCHARGE MEDICATIONS:  His metformin has been temporarily discontinued.  Motrin has been discontinue
d as well.  Clindamycin has been discontinued.  Otherwise, his preadmission home medications as dicta
chuy on history and physical note from 12/08/2017 to be continued.

 

HOSPITAL COURSE:  Mr. Goldsmith is a pleasant 83-year-old gentleman, who was admitted to [facility] on 12/08/2017 for acute on chronic renal insufficiency, with a creatinine of 6.62.
  He was seen by Nephrology Service, Dr. Parker Hung.  He was treated with intravenous fluids.  He also
 received bicarbonate for acidosis.  He improved clinically.  His metformin continues to be on hold. 
 Nonsteroidal anti-inflammatory agents were discontinued.  He is being discharged back to the nursing
 home in a stable condition.  He will need his creatinine and electrolytes checked in approximately a
 week and a decision needs to be made about continuing the metformin.

 

Please note that he is on insulin sliding scale, which needs to be continued.

 

On the day of discharge, Mr. Goldsmith has sodium 135, potassium 4.3, creatinine 2.10, and a bicarbonate
 26.  On 12/11/2017, he had a white count of 5700, hemoglobin 9, and platelet count 294,000.

 

Many thanks for allowing me to participate in your patient's care.  Please feel free to contact me wi
th any questions or concerns.

 

DISCHARGE DESTINATION:  [facility].

 

TOTAL AMOUNT OF TIME SPENT COORDINATING THIS DISCHARGE:  33 minutes.

## 2017-12-12 NOTE — EKG
Test Reason : 

Blood Pressure : ***/*** mmHG

Vent. Rate : 073 BPM     Atrial Rate : 073 BPM

   P-R Int : 190 ms          QRS Dur : 086 ms

    QT Int : 426 ms       P-R-T Axes : 068 -02 024 degrees

   QTc Int : 469 ms

 

Sinus rhythm with Premature atrial complexes

Otherwise normal ECG

 

Confirmed by JEEVAN MOLINA MD ([id number]),  RIGOBERTO BEAN ([id number]) on 12/12/2017 1:25:29 PM

 

Referred By:             Confirmed By:JEEVAN MOLINA MD

## 2017-12-12 NOTE — PRG
Patient Name:  SHERRIE CABRAL

Date of service:  12/12/2017

 

Subjective:

Patient was seen and examined at bedside and overnight events noted. 

Patient denies any shortness of breath or chest pain or palpitation. 

No history of nausea or vomiting or diarrhea or fever or chills or cramps. 

 

Objective:

General: This is an elderly male in no apparent distress

Vital signs: Temperature 97.9, pulse 83, respirations 16, blood pressure 154/62. 

HEENT:  Atraumatic, normocephalic.  Oral mucosa is moist.

Neck:  Supple.  

Cardiovascular: S1 S2 heard.  Rate and rhythm regular.

Respiratory:  Clear to auscultation

Gastrointestinal:  Abdomen is soft. 

Musculoskeletal:  No tenderness.  No edema.

Dermatologic:  No skin rash.

Neurologic:  Alert and awake and oriented X3.  No focal neurologic deficits.  Moving all the extremit
ies.

Psychiatric:  Mood and affect normal.

 

LABORATORY:  No labs done today.

 

ASSESSMENT AND PLAN:

1.  Acute kidney injury was improving and repeat labs today.  Continue on intravenous fluids.

2.  Acidosis, much better.

3.  Hypertension.

4.  Chronic disease.

 

Overall, plan is to continue on IV fluids.  Repeat labs today and follow.  Avoid nephrotoxins.

## 2018-01-03 ENCOUNTER — HOSPITAL ENCOUNTER (EMERGENCY)
Dept: HOSPITAL 92 - ERS | Age: 83
Discharge: NURSING, REHAB OR LONG-TERM CARE | End: 2018-01-03
Payer: MEDICARE

## 2018-01-03 DIAGNOSIS — Z79.84: ICD-10-CM

## 2018-01-03 DIAGNOSIS — I10: ICD-10-CM

## 2018-01-03 DIAGNOSIS — D64.9: ICD-10-CM

## 2018-01-03 DIAGNOSIS — M54.5: Primary | ICD-10-CM

## 2018-01-03 DIAGNOSIS — F03.90: ICD-10-CM

## 2018-01-03 DIAGNOSIS — Z79.899: ICD-10-CM

## 2018-01-03 DIAGNOSIS — E11.9: ICD-10-CM

## 2018-01-03 DIAGNOSIS — F31.9: ICD-10-CM

## 2018-01-03 LAB
ALBUMIN SERPL BCG-MCNC: 3 G/DL (ref 3.4–4.8)
ALP SERPL-CCNC: 78 U/L (ref 40–150)
ALT SERPL W P-5'-P-CCNC: 15 U/L (ref 8–55)
ANION GAP SERPL CALC-SCNC: 11 MMOL/L (ref 10–20)
AST SERPL-CCNC: 15 U/L (ref 5–34)
BASOPHILS # BLD AUTO: 0 THOU/UL (ref 0–0.2)
BASOPHILS NFR BLD AUTO: 0.8 % (ref 0–1)
BILIRUB SERPL-MCNC: 0.5 MG/DL (ref 0.2–1.2)
BUN SERPL-MCNC: 20 MG/DL (ref 8.4–25.7)
CALCIUM SERPL-MCNC: 9.3 MG/DL (ref 7.8–10.44)
CHLORIDE SERPL-SCNC: 105 MMOL/L (ref 98–107)
CO2 SERPL-SCNC: 29 MMOL/L (ref 23–31)
CREAT CL PREDICTED SERPL C-G-VRATE: 0 ML/MIN (ref 70–130)
EOSINOPHIL # BLD AUTO: 0.2 THOU/UL (ref 0–0.7)
EOSINOPHIL NFR BLD AUTO: 2.5 % (ref 0–10)
GLOBULIN SER CALC-MCNC: 3.6 G/DL (ref 2.4–3.5)
GLUCOSE SERPL-MCNC: 156 MG/DL (ref 83–110)
HGB BLD-MCNC: 9.3 G/DL (ref 14–18)
LYMPHOCYTES # BLD: 1.7 THOU/UL (ref 1.2–3.4)
LYMPHOCYTES NFR BLD AUTO: 28.1 % (ref 21–51)
MCH RBC QN AUTO: 35 PG (ref 27–31)
MCV RBC AUTO: 105 FL (ref 80–94)
MONOCYTES # BLD AUTO: 0.6 THOU/UL (ref 0.11–0.59)
MONOCYTES NFR BLD AUTO: 9.3 % (ref 0–10)
NEUTROPHILS # BLD AUTO: 3.6 THOU/UL (ref 1.4–6.5)
NEUTROPHILS NFR BLD AUTO: 59.3 % (ref 42–75)
PLATELET # BLD AUTO: 232 THOU/UL (ref 130–400)
POTASSIUM SERPL-SCNC: 4.2 MMOL/L (ref 3.5–5.1)
RBC # BLD AUTO: 2.65 MILL/UL (ref 4.7–6.1)
SODIUM SERPL-SCNC: 141 MMOL/L (ref 136–145)
WBC # BLD AUTO: 6 THOU/UL (ref 4.8–10.8)

## 2018-01-03 PROCEDURE — 36415 COLL VENOUS BLD VENIPUNCTURE: CPT

## 2018-01-03 PROCEDURE — 70450 CT HEAD/BRAIN W/O DYE: CPT

## 2018-01-03 PROCEDURE — 80053 COMPREHEN METABOLIC PANEL: CPT

## 2018-01-03 PROCEDURE — 72100 X-RAY EXAM L-S SPINE 2/3 VWS: CPT

## 2018-01-03 PROCEDURE — 85025 COMPLETE CBC W/AUTO DIFF WBC: CPT

## 2018-01-03 NOTE — CT
EXAM:

NONCONTRAST HEAD CT

1/3/18

 

HISTORY: 

Patient fell out of bed, hitting head. Posttraumatic pain.

 

COMPARISON: 

None.

 

TECHNIQUE:  

A noncontrast head CT is performed from skull base to skull vertex. 

 

FINDINGS:  

No parenchymal hemorrhage. No extra-axial hematoma. No midline shift. 

 

Basilar cisterns are patent.

 

Age appropriate atrophy. Cortical gray-white matter differentiation is preserved. 

 

Ventricles and sulci are patent and symmetric. Prominence of ventricular system is in keeping with th
e overall degree of atrophy. Chronic small vessel ischemic change of the white matter identified. 

 

Calvarium is intact. Adequate mastoid air cell aeration. There is extensive paranasal sinus disease w
ith opacification and sclerosis as well as hypertrophy suggesting a chronic process. 

 

IMPRESSION:  

1.      No acute intracranial process. No intracranial posttraumatic sequela. 

2.      Chronic sinus disease. 

 

POS: STEPHIEH

## 2018-01-03 NOTE — RAD
THREE VIEWS LUMBAR SPINE

1/3/18

 

HISTORY: 

Pain. Patient fell out of bed. 

 

COMPARISON:  

None. 

 

FINDINGS:  

There is fusion hardware across the left and right sacroiliac joints. 

 

Vacuum disc phenomenon at L4-L5. Moderate degenerative disease at L4-L5 and L5-S1. There are degenera
tive changes of posterior elements at L4-L5 and L5-S1. 

 

5 mm of retrolisthesis of L4 upon L5. 

 

No acute fractures. 

 

Mild degenerative changes in the distal thoracic spine. Note, there are five lumbar type vertebral michelle
dies.

 

IMPRESSION:  

1.      No posttraumatic change. 

2.      Degenerative changes and surgical changes as above. 

 

POS: JESUS